# Patient Record
Sex: FEMALE | Race: BLACK OR AFRICAN AMERICAN | URBAN - METROPOLITAN AREA
[De-identification: names, ages, dates, MRNs, and addresses within clinical notes are randomized per-mention and may not be internally consistent; named-entity substitution may affect disease eponyms.]

---

## 2020-10-22 ENCOUNTER — HOSPITAL ENCOUNTER (OUTPATIENT)
Dept: OCCUPATIONAL MEDICINE | Age: 50
Discharge: HOME | End: 2020-10-22

## 2020-10-22 ENCOUNTER — TRANSCRIBE ORDER (OUTPATIENT)
Dept: OCCUPATIONAL MEDICINE | Age: 50
End: 2020-10-22

## 2020-10-22 DIAGNOSIS — T14.90XA INJURY: ICD-10-CM

## 2020-10-22 DIAGNOSIS — T14.90XA INJURY: Primary | ICD-10-CM

## 2023-11-15 NOTE — PROGRESS NOTES
\"VLDL\"  No results found for: \"CHOLHDLRATIO\"     No results found for: \"NA\", \"K\", \"CL\", \"CO2\", \"BUN\", \"CREATININE\", \"GLUCOSE\", \"CALCIUM\"      No results found for: \"ALT\", \"AST\"     Assessment/Plan:   1.  Family history of cardiomyopathy  - Obtain an echocardiogram     F/U: As needed    Kiki Ga MD

## 2023-11-16 ENCOUNTER — INITIAL CONSULT (OUTPATIENT)
Age: 53
End: 2023-11-16

## 2023-11-16 VITALS
HEIGHT: 65 IN | SYSTOLIC BLOOD PRESSURE: 130 MMHG | HEART RATE: 76 BPM | BODY MASS INDEX: 36.92 KG/M2 | WEIGHT: 221.6 LBS | DIASTOLIC BLOOD PRESSURE: 80 MMHG

## 2023-11-16 DIAGNOSIS — Z82.49 FAMILY HISTORY OF CARDIOMYOPATHY: Primary | ICD-10-CM

## 2023-12-27 ENCOUNTER — TELEPHONE (OUTPATIENT)
Age: 53
End: 2023-12-27

## 2023-12-27 NOTE — TELEPHONE ENCOUNTER
----- Message from Mick Cortez MD sent at 12/27/2023  9:18 AM EST -----  Please let the patient know that the heart function is normal on ECHO. The patient does have mild mitral regurgitation documented on the echocardiogram.  Therefore, this warrants a surveillance echocardiogram in 3 to 5 years. Mild mitral regurgitation is a common finding on echocardiography and does not account for the patient's symptoms. I recommend the future echocardiogram be ordered by the patient's PCP's office at that time, and the patient can follow-up with us if needed if there is progression. A message has been sent to the patient's PCP with the recommendation.